# Patient Record
Sex: FEMALE | Race: WHITE | NOT HISPANIC OR LATINO | Employment: UNEMPLOYED | URBAN - METROPOLITAN AREA
[De-identification: names, ages, dates, MRNs, and addresses within clinical notes are randomized per-mention and may not be internally consistent; named-entity substitution may affect disease eponyms.]

---
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
## 2022-08-02 ENCOUNTER — APPOINTMENT (OUTPATIENT)
Dept: RADIOLOGY | Facility: CLINIC | Age: 22
End: 2022-08-02
Payer: COMMERCIAL

## 2022-08-02 ENCOUNTER — OFFICE VISIT (OUTPATIENT)
Dept: URGENT CARE | Facility: CLINIC | Age: 22
End: 2022-08-02
Payer: COMMERCIAL

## 2022-08-02 VITALS — TEMPERATURE: 98.7 F | RESPIRATION RATE: 18 BRPM | OXYGEN SATURATION: 99 % | HEART RATE: 89 BPM

## 2022-08-02 DIAGNOSIS — S93.401A SPRAIN OF RIGHT ANKLE, UNSPECIFIED LIGAMENT, INITIAL ENCOUNTER: ICD-10-CM

## 2022-08-02 DIAGNOSIS — S93.401A SPRAIN OF RIGHT ANKLE, UNSPECIFIED LIGAMENT, INITIAL ENCOUNTER: Primary | ICD-10-CM

## 2022-08-02 PROCEDURE — G0382 LEV 3 HOSP TYPE B ED VISIT: HCPCS

## 2022-08-02 PROCEDURE — 73610 X-RAY EXAM OF ANKLE: CPT

## 2022-08-02 NOTE — PATIENT INSTRUCTIONS
Continue to RICE: rest area, ice it, keep ACE wrap compression on, and elevate above the level of the heart  Take ibuprofen for pain and swelling control  Continue to use crutches for the next 2-3 days to rest ankle  Then, start to walk on ankle and slowly move to full mobility  Refrain from exercise/sportsclass for at least one week  Follow up with PCP or orthopedics for continued ankle pain  Proceed to ER if symptoms worsen

## 2022-08-02 NOTE — PROGRESS NOTES
[facility]        NAME: Kirit Arredondo is a 23 y o  female  : 2000    MRN: [id number]  DATE: 2022  TIME: 1:59 PM    Assessment and Plan   Sprain of right ankle, unspecified ligament, initial encounter [S93 401A]  1  Sprain of right ankle, unspecified ligament, initial encounter  XR ankle 3+ vw right     Xr of the ankle obtained: no acute intraosseous abnormality  Wrapped ankle in ACE wrap  Recommended RICE and ibuprofen for symptoms  Follow up with ortho given chronic daily ankle pain and especially in setting of new injury  Follow up with PCP regarding ankle pain  Patient Instructions     Continue to RICE: rest area, ice it, keep ACE wrap compression on, and elevate above the level of the heart  Take ibuprofen for pain and swelling control  Continue to use crutches for the next 2-3 days to rest ankle  Then, start to walk on ankle and slowly move to full mobility  Refrain from exercise/sportsclass for at least one week  Follow up with PCP or orthopedics for continued ankle pain  Proceed to ER if symptoms worsen  Chief Complaint     Chief Complaint   Patient presents with    Ankle Injury     Per pt tore ankle ligament a few years ago and never addressed  It  States she has had a few ankle problems since then and they would resolve quickly  States she rolled ankle on  and is still having discomfort  Rates pain 4/10 when not using ankle increases with weight  History of Present Illness       Presents with 2 days of right ankle pain after rolling it  She did supportive care without relief  Pain is 4/10 and intermittent  Better with rest and worse with movement/weight  History of ankle problems including reported tore ankle ligament a few years ago and never addressed the problem  States she has had a few ankle problems since then and they would resolve quickly  Tried icing the area, motrin, and wrapping the area   At baseline pain is 2/10 with walking/activity  Review of Systems   Review of Systems   Constitutional: Negative for chills, fatigue and fever  HENT: Negative for congestion  Respiratory: Negative for cough and shortness of breath  Cardiovascular: Negative for chest pain  Musculoskeletal: Positive for myalgias  Skin: Negative for wound  Neurological: Negative for syncope  Psychiatric/Behavioral: Negative for confusion  Current Medications       Current Outpatient Medications:     ibuprofen (MOTRIN) 400 mg tablet, Take 1 tablet (400 mg total) by mouth every 6 (six) hours as needed for mild pain for up to 5 days, Disp: 30 tablet, Rfl: 0    Current Allergies     Allergies as of 08/02/2022    (No Known Allergies)            The following portions of the patient's history were reviewed and updated as appropriate: allergies, current medications, past family history, past medical history, past social history, past surgical history and problem list      History reviewed  No pertinent past medical history  Past Surgical History:   Procedure Laterality Date    THYROGLOSSAL DUCT EXCISION         Family History   Problem Relation Age of Onset    No Known Problems Mother     No Known Problems Father          Medications have been verified  Objective   Pulse 89   Temp 98 7 °F (37 1 °C)   Resp 18   SpO2 99%        Physical Exam     Physical Exam  Vitals reviewed  Constitutional:       Appearance: Normal appearance  Cardiovascular:      Rate and Rhythm: Normal rate and regular rhythm  Pulses: Normal pulses  Dorsalis pedis pulses are 2+ on the right side  Posterior tibial pulses are 2+ on the right side  Heart sounds: Normal heart sounds  No murmur heard  Pulmonary:      Effort: Pulmonary effort is normal  No respiratory distress  Breath sounds: Normal breath sounds  Musculoskeletal:         General: Normal range of motion  Right foot: Normal range of motion     Feet: Right foot:      Protective Sensation: 5 sites tested  5 sites sensed  Comments: Moderate swelling to the medial and lateral malleolus  AROM intact but painful  Skin:     General: Skin is warm and dry  Capillary Refill: Capillary refill takes less than 2 seconds  Neurological:      General: No focal deficit present  Mental Status: She is alert and oriented to person, place, and time     Psychiatric:         Mood and Affect: Mood normal          Behavior: Behavior normal